# Patient Record
Sex: MALE | Race: WHITE
[De-identification: names, ages, dates, MRNs, and addresses within clinical notes are randomized per-mention and may not be internally consistent; named-entity substitution may affect disease eponyms.]

---

## 2018-08-15 ENCOUNTER — TRANSCRIPTION ENCOUNTER (OUTPATIENT)
Age: 53
End: 2018-08-15

## 2018-08-23 ENCOUNTER — EMERGENCY (EMERGENCY)
Facility: HOSPITAL | Age: 53
LOS: 1 days | Discharge: HOME | End: 2018-08-23
Attending: EMERGENCY MEDICINE
Payer: COMMERCIAL

## 2018-08-23 ENCOUNTER — TRANSCRIPTION ENCOUNTER (OUTPATIENT)
Age: 53
End: 2018-08-23

## 2018-08-23 VITALS
OXYGEN SATURATION: 100 % | SYSTOLIC BLOOD PRESSURE: 112 MMHG | RESPIRATION RATE: 18 BRPM | DIASTOLIC BLOOD PRESSURE: 74 MMHG | TEMPERATURE: 98 F | HEART RATE: 71 BPM

## 2018-08-23 VITALS
WEIGHT: 171.96 LBS | RESPIRATION RATE: 18 BRPM | OXYGEN SATURATION: 97 % | SYSTOLIC BLOOD PRESSURE: 109 MMHG | HEART RATE: 76 BPM | DIASTOLIC BLOOD PRESSURE: 76 MMHG | HEIGHT: 70 IN | TEMPERATURE: 98 F

## 2018-08-23 LAB
ALBUMIN SERPL ELPH-MCNC: 3.1 G/DL — LOW (ref 3.5–5)
ALP SERPL-CCNC: 990 U/L — HIGH (ref 40–120)
ALT FLD-CCNC: 456 U/L DA — HIGH (ref 10–60)
ANION GAP SERPL CALC-SCNC: 7 MMOL/L — SIGNIFICANT CHANGE UP (ref 5–17)
APTT BLD: 31.6 SEC — SIGNIFICANT CHANGE UP (ref 27.5–37.4)
AST SERPL-CCNC: 250 U/L — HIGH (ref 10–40)
BASOPHILS # BLD AUTO: 0 K/UL — SIGNIFICANT CHANGE UP (ref 0–0.2)
BASOPHILS NFR BLD AUTO: 0.6 % — SIGNIFICANT CHANGE UP (ref 0–2)
BILIRUB SERPL-MCNC: 1 MG/DL — SIGNIFICANT CHANGE UP (ref 0.2–1.2)
BUN SERPL-MCNC: 16 MG/DL — SIGNIFICANT CHANGE UP (ref 7–18)
CALCIUM SERPL-MCNC: 9.2 MG/DL — SIGNIFICANT CHANGE UP (ref 8.4–10.5)
CHLORIDE SERPL-SCNC: 101 MMOL/L — SIGNIFICANT CHANGE UP (ref 96–108)
CO2 SERPL-SCNC: 29 MMOL/L — SIGNIFICANT CHANGE UP (ref 22–31)
CREAT SERPL-MCNC: 0.7 MG/DL — SIGNIFICANT CHANGE UP (ref 0.5–1.3)
EOSINOPHIL # BLD AUTO: 0 K/UL — SIGNIFICANT CHANGE UP (ref 0–0.5)
EOSINOPHIL NFR BLD AUTO: 0.3 % — SIGNIFICANT CHANGE UP (ref 0–6)
ERYTHROCYTE [SEDIMENTATION RATE] IN BLOOD: 77 MM/HR — HIGH (ref 0–20)
GLUCOSE SERPL-MCNC: 90 MG/DL — SIGNIFICANT CHANGE UP (ref 70–99)
HCT VFR BLD CALC: 38.1 % — LOW (ref 39–50)
HGB BLD-MCNC: 12 G/DL — LOW (ref 13–17)
HIV 1 & 2 AB SERPL IA.RAPID: SIGNIFICANT CHANGE UP
INR BLD: 1.07 RATIO — SIGNIFICANT CHANGE UP (ref 0.88–1.16)
LYMPHOCYTES # BLD AUTO: 1.1 K/UL — SIGNIFICANT CHANGE UP (ref 1–3.3)
LYMPHOCYTES # BLD AUTO: 15 % — SIGNIFICANT CHANGE UP (ref 13–44)
MCHC RBC-ENTMCNC: 27.9 PG — SIGNIFICANT CHANGE UP (ref 27–34)
MCHC RBC-ENTMCNC: 31.4 GM/DL — LOW (ref 32–36)
MCV RBC AUTO: 88.8 FL — SIGNIFICANT CHANGE UP (ref 80–100)
MONOCYTES # BLD AUTO: 1.3 K/UL — HIGH (ref 0–0.9)
MONOCYTES NFR BLD AUTO: 16.4 % — HIGH (ref 2–14)
NEUTROPHILS # BLD AUTO: 5.2 K/UL — SIGNIFICANT CHANGE UP (ref 1.8–7.4)
NEUTROPHILS NFR BLD AUTO: 67.7 % — SIGNIFICANT CHANGE UP (ref 43–77)
PLATELET # BLD AUTO: 294 K/UL — SIGNIFICANT CHANGE UP (ref 150–400)
POTASSIUM SERPL-MCNC: 3.9 MMOL/L — SIGNIFICANT CHANGE UP (ref 3.5–5.3)
POTASSIUM SERPL-SCNC: 3.9 MMOL/L — SIGNIFICANT CHANGE UP (ref 3.5–5.3)
PROT SERPL-MCNC: 8.6 G/DL — HIGH (ref 6–8.3)
PROTHROM AB SERPL-ACNC: 11.7 SEC — SIGNIFICANT CHANGE UP (ref 9.8–12.7)
RBC # BLD: 4.29 M/UL — SIGNIFICANT CHANGE UP (ref 4.2–5.8)
RBC # FLD: 13.4 % — SIGNIFICANT CHANGE UP (ref 10.3–14.5)
SODIUM SERPL-SCNC: 137 MMOL/L — SIGNIFICANT CHANGE UP (ref 135–145)
WBC # BLD: 7.6 K/UL — SIGNIFICANT CHANGE UP (ref 3.8–10.5)
WBC # FLD AUTO: 7.6 K/UL — SIGNIFICANT CHANGE UP (ref 3.8–10.5)

## 2018-08-23 PROCEDURE — 99283 EMERGENCY DEPT VISIT LOW MDM: CPT

## 2018-08-23 PROCEDURE — 80074 ACUTE HEPATITIS PANEL: CPT

## 2018-08-23 PROCEDURE — 85027 COMPLETE CBC AUTOMATED: CPT

## 2018-08-23 PROCEDURE — 80053 COMPREHEN METABOLIC PANEL: CPT

## 2018-08-23 PROCEDURE — 86140 C-REACTIVE PROTEIN: CPT

## 2018-08-23 PROCEDURE — 85610 PROTHROMBIN TIME: CPT

## 2018-08-23 PROCEDURE — 86308 HETEROPHILE ANTIBODY SCREEN: CPT

## 2018-08-23 PROCEDURE — 86703 HIV-1/HIV-2 1 RESULT ANTBDY: CPT

## 2018-08-23 PROCEDURE — 85652 RBC SED RATE AUTOMATED: CPT

## 2018-08-23 PROCEDURE — 85730 THROMBOPLASTIN TIME PARTIAL: CPT

## 2018-08-23 PROCEDURE — 86780 TREPONEMA PALLIDUM: CPT

## 2018-08-23 PROCEDURE — 86592 SYPHILIS TEST NON-TREP QUAL: CPT

## 2018-08-23 PROCEDURE — 86593 SYPHILIS TEST NON-TREP QUANT: CPT

## 2018-08-23 NOTE — ED ADULT NURSE NOTE - NSIMPLEMENTINTERV_GEN_ALL_ED
Implemented All Universal Safety Interventions:  [place] to call system. Call bell, personal items and telephone within reach. Instruct patient to call for assistance. Room bathroom lighting operational. Non-slip footwear when patient is off stretcher. Physically safe environment: no spills, clutter or unnecessary equipment. Stretcher in lowest position, wheels locked, appropriate side rails in place.

## 2018-08-23 NOTE — ED PROVIDER NOTE - CARE PLAN
Principal Discharge DX:	Rash  Secondary Diagnosis:	Anemia  Secondary Diagnosis:	Elevated liver function tests

## 2018-08-23 NOTE — ED PROVIDER NOTE - MEDICAL DECISION MAKING DETAILS
pt with rash for 1 week, no concern for endocarditis, syphilis or rickettsia, will get labs, advised to f/u with derm

## 2018-08-23 NOTE — ED PROVIDER NOTE - PROGRESS NOTE DETAILS
pt with elevated LFT's, elevated monocytes, will send for mono titer, since asymptomatic, advised to f/u with GI, derm Pt with +RPR, spoke to pt, will go to Urgent Care today

## 2018-08-23 NOTE — ED ADULT NURSE NOTE - OBJECTIVE STATEMENT
sent by urgent care for rash on hands, feet, abd, sent for eval for [place] lesions. Pt denies any pain, no itchiness sent by urgent care for rash on hands, feet, abd, sent for eval for [place] lesions. Pt denies any pain, no itchiness. States "I broke into rashes about a week ago." When inquired about last thing patient has ingested or has been exposed to prior to getting rashes, patient states "I don't want to say." AA&Ox3.

## 2018-08-23 NOTE — ED PROVIDER NOTE - OBJECTIVE STATEMENT
53 y.o. male c/o rash on hands, feet, abd pain for 1 week, no sore throat, dysphagia, fever, pain, CP, not itchy, not on genitalia, no penile discharge, pt with h/o STD, seen @ Urgent Care & sent to ED.  Pt went to [place] in June.  Pt was treated with Z maegan for sinusitis recently, noted rash appear after antibiotic treatment.  Seen @ Urgent Care, sent to ED for further eval

## 2018-08-23 NOTE — ED ADULT NURSE NOTE - CHIEF COMPLAINT
Thank you for letting us care for you today.      -Your prescriptions today went to   [facility] - [place], [place] - [address]  [facility] [address]  Phone: [phone] Fax: [phone]   Please let us know if there were any problems.   If we had to do paperwork with your insurance to get it approved it may take a few days or sometimes up to a week.    If you had blood drawn today, please allow 3-5 days for us to contact you with results. If have an active [employer] account, you may be able to view your results within 24-48 hours. Please allow your provider time to review your results and give you further direction. If you haven't been contacted regarding your results within a week, please call the office at [phone].    Return in about 2 weeks (around 4/3/2017) for BP recheck.    If you are getting a test or referral please call your insurance company to see if the test, provider, and location of service are covered and if so will there be any out of pocket expenses for you.      
The patient is a 53y Male complaining of rash.

## 2018-08-24 LAB
CRP SERPL-MCNC: 6.57 MG/DL — HIGH (ref 0–0.4)
HAV IGM SER-ACNC: SIGNIFICANT CHANGE UP
HBV CORE IGM SER-ACNC: SIGNIFICANT CHANGE UP
HBV SURFACE AG SER-ACNC: SIGNIFICANT CHANGE UP
HCV AB S/CO SERPL IA: 0.11 S/CO — SIGNIFICANT CHANGE UP
HCV AB SERPL-IMP: SIGNIFICANT CHANGE UP
HETEROPH AB TITR SER AGGL: NEGATIVE — SIGNIFICANT CHANGE UP
RPR SER-TITR: (no result)
RPR SERPL-ACNC: REACTIVE
T PALLIDUM AB TITR SER: POSITIVE

## 2018-08-27 ENCOUNTER — EMERGENCY (EMERGENCY)
Facility: HOSPITAL | Age: 53
LOS: 1 days | Discharge: HOME | End: 2018-08-27
Attending: EMERGENCY MEDICINE
Payer: COMMERCIAL

## 2018-08-27 ENCOUNTER — TRANSCRIPTION ENCOUNTER (OUTPATIENT)
Age: 53
End: 2018-08-27

## 2018-08-27 VITALS — WEIGHT: 169.98 LBS | HEIGHT: 70 IN

## 2018-08-27 VITALS
HEART RATE: 84 BPM | TEMPERATURE: 98 F | RESPIRATION RATE: 16 BRPM | SYSTOLIC BLOOD PRESSURE: 115 MMHG | OXYGEN SATURATION: 95 % | DIASTOLIC BLOOD PRESSURE: 72 MMHG

## 2018-08-27 PROBLEM — G35 MULTIPLE SCLEROSIS: Chronic | Status: ACTIVE | Noted: 2018-08-23

## 2018-08-27 PROCEDURE — 96372 THER/PROPH/DIAG INJ SC/IM: CPT

## 2018-08-27 PROCEDURE — 99284 EMERGENCY DEPT VISIT MOD MDM: CPT

## 2018-08-27 PROCEDURE — 99283 EMERGENCY DEPT VISIT LOW MDM: CPT | Mod: 25

## 2018-08-27 RX ORDER — PENICILLIN G BENZATHINE 1200000 [IU]/2ML
2.4 INJECTION, SUSPENSION INTRAMUSCULAR ONCE
Qty: 0 | Refills: 0 | Status: COMPLETED | OUTPATIENT
Start: 2018-08-27 | End: 2018-08-27

## 2018-08-27 RX ADMIN — PENICILLIN G BENZATHINE 2.4 MILLION UNIT(S): 1200000 INJECTION, SUSPENSION INTRAMUSCULAR at 14:41

## 2018-08-27 NOTE — ED PROVIDER NOTE - OBJECTIVE STATEMENT
53 y.o. male c/o rash for past 10 days, seen in ED on Thursday, blood work done, pt was tested positive for RPR, advised pt to get treatment.  Pt1st went to Urgent Care, but ran out of Pen G, pt now presents to ED for IM treatment, subjective feverish since last week, rash appears to be the same, no changes

## 2018-08-27 NOTE — ED ADULT NURSE NOTE - NSIMPLEMENTINTERV_GEN_ALL_ED
Implemented All Universal Safety Interventions:  [place] to call system. Call bell, personal items and telephone within reach. Instruct patient to call for assistance. Room bathroom lighting operational. Non-slip footwear when patient is off stretcher. Physically safe environment: no spills, clutter or unnecessary equipment. Stretcher in lowest position, wheels locked, appropriate side rails in place.

## 2020-09-29 ENCOUNTER — TRANSCRIPTION ENCOUNTER (OUTPATIENT)
Age: 55
End: 2020-09-29

## 2020-12-12 ENCOUNTER — TRANSCRIPTION ENCOUNTER (OUTPATIENT)
Age: 55
End: 2020-12-12

## 2021-07-24 ENCOUNTER — TRANSCRIPTION ENCOUNTER (OUTPATIENT)
Age: 56
End: 2021-07-24

## 2021-09-30 NOTE — ED ADULT NURSE NOTE - NSFALLRSKUNASSIST_ED_ALL_ED
[facility] Progress Note    Admitting Date and Time: 9/28/2021  5:51 PM  Admit Dx: DELORIS (acute kidney injury) ([facility] [address] [N17.9]    Subjective:  Patient is being followed for DELORIS (acute kidney injury) ([facility] [address] [N17.9]   Pt feels pretty good despite having the cystoscopy today with basket retrieval and stent placement by urology. His BP has been elevated. He has refused the amlodipine because he notes that in the past it dropped his BP too much. He agrees to try a different medication to help lower his BP. His lisinopril is currently still on hold due to his elevated creatinine. The patient denies any CP or SOB. He continues to have hematuria. We discussed the reasons for this. Per RN: patient refusing meds for BP. BP elevated significantly. ROS: denies fever, chills, cp, sob, n/v, HA unless stated above.       ceFAZolin  1,000 mg IntraVENous Q8H    sodium chloride flush  5-40 mL IntraVENous 2 times per day    metoprolol succinate  50 mg Oral Daily    gabapentin  600 mg Oral Nightly    tamsulosin  0.8 mg Oral Nightly    amLODIPine  10 mg Oral Daily    aspirin  81 mg Oral Daily    [Held by provider] clopidogrel  75 mg Oral Daily    ezetimibe  10 mg Oral Daily    hydrALAZINE  25 mg Oral TID    levothyroxine  25 mcg Oral Daily    [Held by provider] lisinopril  40 mg Oral Daily    sodium chloride flush  5-40 mL IntraVENous 2 times per day    enoxaparin  40 mg SubCUTAneous Daily    insulin glargine  10 Units SubCUTAneous Nightly    insulin lispro  0-12 Units SubCUTAneous TID WC    insulin lispro  0-6 Units SubCUTAneous Nightly     meperidine, 25 mg, Q5 Min PRN  ondansetron, 4 mg, Once PRN  fentanNYL, 25 mcg, Q5 Min PRN  HYDROmorphone, 0.5 mg, Q5 Min PRN  HYDROmorphone, 0.5 mg, Q5 Min PRN  iopamidol, , PRN  sodium chloride flush, 5-40 mL, PRN  sodium chloride, 25 mL, PRN  hydrALAZINE, 10 mg, Q6H PRN  ketorolac, 15 mg, Q8H PRN  sodium chloride flush, 5-40 mL, PRN  sodium chloride, 25 mL, PRN  ondansetron, 4 mg, Q8H PRN   Or  ondansetron, 4 mg, Q6H PRN  polyethylene glycol, 17 g, Daily PRN  acetaminophen, 650 mg, Q6H PRN   Or  acetaminophen, 650 mg, Q6H PRN  potassium chloride, 10 mEq, PRN  magnesium sulfate, 2,000 mg, PRN  morphine, 2 mg, Q4H PRN         Objective:    BP (!) 188/82   Pulse 61   Temp 97.6 °F (36.4 °C) (Temporal)   Resp 18   Ht 5' 8\" (1.727 m)   Wt 204 lb (92.5 kg)   SpO2 96%   BMI 31.02 kg/m²     General Appearance: alert and oriented to person, place and time and in no acute distress  Skin: warm and dry  Head: normocephalic and atraumatic  Eyes: pupils equal, round, and reactive to light, extraocular eye movements intact, conjunctivae normal  Neck: neck supple and non tender without mass   Pulmonary/Chest: clear to auscultation bilaterally- no wheezes, rales or rhonchi, normal air movement, no respiratory distress  Cardiovascular: normal rate, normal S1 and S2 and no carotid bruits  Abdomen: soft, non-tender, non-distended, normal bowel sounds, no masses or organomegaly  Extremities: no cyanosis, no clubbing and no edema  Neurologic: no cranial nerve deficit and speech normal        Recent Labs     09/28/21 1830 09/29/21 0120 09/29/21 1915    139 137   K 3.8 3.5 4.0    106 105   CO2 23 23 22   BUN 19 19 19   CREATININE 1.7* 1.6* 1.9*   GLUCOSE 107* 163* 193*   CALCIUM 8.9 8.5* 8.6       Recent Labs     09/28/21 1830 09/29/21 0120 09/29/21 1915   WBC 8.1 7.1 8.5   RBC 4.88 4.66 4.56   HGB 13.9 13.1 13.0   HCT 41.5 38.9 38.5   MCV 85.0 83.5 84.4   MCH 28.5 28.1 28.5   MCHC 33.5 33.7 33.8   RDW 13.4 13.3 13.2    244 248   MPV 9.9 9.3 9.6         Assessment:    Active Problems:    DELORIS (acute kidney injury) ([facility] [address]  Resolved Problems:    * No resolved hospital problems. *      Plan:  1.   Left obstructive uropathy  -urology on board  -cystoscopy, retrograde pyelogram, ureteroscopy, laser lithotripsy, left stent insertion today  -hematuria present  -patient tolerated the procedure well; 2 stones removed. 2.  DELORIS-secondary to #1-mild worsening of creatinine now at 1.9  -continue to monitor creatinine  -continue IVF  -lisinopril on hold    3. CAD s/p PCI in 2019    4. Hypertensive urgency-persists  -patient was refusing amlodipine. Will try nifedipine. Discussed the importance of bringing down the BP    5. BPH-continue flomax    6. DM2-hold oral hypoglycemics  -continue to monitor BG  -continue Lantus and ISS        NOTE: This report was transcribed using voice recognition software. Every effort was made to ensure accuracy; however, inadvertent computerized transcription errors may be present.   Electronically signed by Do Dasilva MD on 9/30/2021 at 11:22 AM no

## 2023-10-17 ENCOUNTER — TRANSCRIPTION ENCOUNTER (OUTPATIENT)
Age: 58
End: 2023-10-17

## 2023-11-17 ENCOUNTER — TRANSCRIPTION ENCOUNTER (OUTPATIENT)
Age: 58
End: 2023-11-17

## 2023-12-19 ENCOUNTER — TRANSCRIPTION ENCOUNTER (OUTPATIENT)
Age: 58
End: 2023-12-19

## 2024-01-30 ENCOUNTER — TRANSCRIPTION ENCOUNTER (OUTPATIENT)
Age: 59
End: 2024-01-30

## 2024-02-29 ENCOUNTER — TRANSCRIPTION ENCOUNTER (OUTPATIENT)
Age: 59
End: 2024-02-29

## 2024-06-06 NOTE — ED ADULT NURSE NOTE - NS ED NOTE  TALK SOMEONE YN
Gastrointestinal Colonoscopy/Flexible Sigmoidoscopy - Lower Exam Discharge Instructions  Call Dr. Rose at  for any problems or questions.  Contact the doctor’s office for follow up appointment as directed  Medication may cause drowsiness for several hours, therefore, do not drive or operate machinery for remainder of the day.  No alcohol today.  Do not make any important decisions such signing legal paperwork.  Ordinarily, you may resume regular diet and activity after exam unless otherwise specified by your physician.  Because of air put into your colon during exam, you may experience some abdominal distension, relieved by the passage of gas, for several hours.  Contact your physician if you have any of the following:  Excessive amount of bleeding - large amount when having a bowel movement.  Occasional specks of blood with bowel movement would not be unusual.  Severe abdominal pain  Fever or Chills          Instructions given to Yolanda Briggs and other family members.       No